# Patient Record
(demographics unavailable — no encounter records)

---

## 2017-02-08 NOTE — ER DOCUMENT REPORT
ED Blood Sugar Problem





- General


Chief Complaint: High Blood Sugar


Stated Complaint: SUGAR PROBLEMS


Time seen by provider: 09:45


Mode of Arrival: Ambulatory


Information source: Patient


Notes: 


He hasn't had his medications for 3 days he left it at the [facility].  He has 

prescriptions at the pharmacy that he can get.  His endocrinologist is Dr. Miller.


takes 2000 metformin at once, glimiperide 4mg bid, and Toujeo nightly.


TRAVEL OUTSIDE OF THE [place] IN LAST 30 DAYS: No





- Related Data


Allergies/Adverse Reactions: 


 





Penicillins Allergy (Unknown, Verified 02/08/17 08:18)


 











Past Medical History





- General


Information source: Patient





- Social History


Smoking Status: Current Every Day Smoker


Frequency of alcohol use: None


Drug Abuse: None


Lives with: Family


Family History: Reviewed & Not Pertinent





- Past Medical History


Cardiac Medical History: Reports: Hx Hypercholesterolemia - no meds, Hx 

Hypertension


Endocrine Medical History: Reports: Hx Diabetes Mellitus Type 2


Renal/ Medical History: Denies: Hx Peritoneal Dialysis


Psychiatric Medical History: Reports: Hx Depression


Past Surgical History: Reports: Hx Bowel Surgery - cancer, Hx Cholecystectomy





- Immunizations


Hx Diphtheria, Pertussis, Tetanus Vaccination: Yes





Review of Systems





- Review of Systems


Constitutional: No symptoms reported


EENT: No symptoms reported


Cardiovascular: No symptoms reported


Respiratory: No symptoms reported


Gastrointestinal: No symptoms reported


Genitourinary: No symptoms reported


Male Genitourinary: No symptoms reported


Musculoskeletal: No symptoms reported


Skin: No symptoms reported


Hematologic/Lymphatic: No symptoms reported


Neurological/Psychological: No symptoms reported





Physical Exam





- Vital signs


Vitals: 


 











Temp Pulse Resp BP Pulse Ox


 


 98.0 F   90   18   127/87 H  97 


 


 02/08/17 06:49  02/08/17 06:49  02/08/17 06:49  02/08/17 06:49  02/08/17 06:49











Interpretation: Normal





- General


General appearance: Appears well, Alert





- HEENT


Head: Normocephalic, Atraumatic


Eyes: Normal


Pupils: PERRL


Pharynx: Normal


Neck: Supple





- Respiratory


Respiratory status: No respiratory distress


Chest status: Nontender


Breath sounds: Normal


Chest palpation: Normal





- Cardiovascular


Rhythm: Regular


Heart sounds: Normal auscultation


Murmur: No





- Abdominal


Inspection: Normal


Distension: No distension


Bowel sounds: Normal


Tenderness: Nontender


Organomegaly: No organomegaly





- Back


Back: Normal, Nontender





- Extremities


General upper extremity: Normal inspection, Nontender, Normal color, Normal ROM

, Normal temperature


General lower extremity: Normal inspection, Nontender, Normal color, Normal ROM

, Normal temperature, Normal weight bearing.  No: Christopher's sign





- Neurological


Neuro grossly intact: Yes


Cognition: Normal


Orientation: AAOx4


Jacklyn Coma Scale Eye Opening: Spontaneous


Jacklyn Coma Scale Verbal: Oriented


Fisher Coma Scale Motor: Obeys Commands


Fisher Coma Scale Total: 15


Speech: Normal


Motor strength normal: LUE, RUE, LLE, RLE


Sensory: Normal





- Psychological


Associated symptoms: Normal affect, Normal mood





- Skin


Skin Temperature: Warm


Skin Moisture: Dry


Skin Color: Normal


Skin irregularity: negative: Rash





Course





- Re-evaluation


Re-evalutation: 


02/08/17 09:40


consult dr. art about tx and disposition per [facility] guidelines.





02/08/17 11:39


glucose down, meds given, has prescriptions at pharmacy that he can fill since 

his meds will not be brought home by friend until tomorrow.





- Vital Signs


Vital signs: 


 











Temp Pulse Resp BP Pulse Ox


 


 97.6 F   73   18   154/86 H  99 


 


 02/08/17 11:36  02/08/17 11:36  02/08/17 08:05  02/08/17 11:36  02/08/17 11:36














- Laboratory


Result Diagrams: 


 02/08/17 08:57





 02/08/17 08:57


Laboratory results interpreted by me: 


 











  02/08/17 02/08/17 02/08/17





  06:56 08:57 09:43


 


Glucose   347 H 


 


POC Glucose  348 H   322 H


 


AST   77 H 


 


ALT   116 H 














  02/08/17





  11:02


 


Glucose 


 


POC Glucose  295 H


 


AST 


 


ALT 














Discharge





- Discharge


Clinical Impression: 


 Hyperglycemia





Diabetes


Qualifiers:


 Diabetes mellitus type: type 2 Diabetes mellitus complication status: without 

complication Diabetes mellitus long term insulin use: with long term use 

Qualified Code(s): E11.9 - Type 2 diabetes mellitus without complications





Condition: Good


Disposition: HOME, SELF-CARE


Instructions:  Diabetes ([facility])


Additional Instructions: 


go directly to the pharmacy and get your medication since your friend is not 

coming till tomorrow with your insulin and oral medications


Return to the emergency room any concerns


Keep track closely on your glucose today.


Forms:  Return to Work

## 2017-05-29 NOTE — ER DOCUMENT REPORT
ED Blood Sugar Problem





- General


Mode of Arrival: Ambulatory


Information source: Patient


TRAVEL OUTSIDE OF THE [place] IN LAST 30 DAYS: No





- HPI


Patient complains to provider of: High Blood Sugar


Onset: This morning


Associated symptoms: Other - see notes above





<SAMPSON CASTRO - Last Filed: 05/29/17 07:12>





<SAMI HAIR - Last Filed: 05/29/17 13:55>





- General


Chief Complaint: High Blood Sugar


Stated Complaint: HIGH BLOOD SUGAR


Time Seen by Provider: 05/29/17 06:00


Notes: 


55 year old male with history of insulin dependent diabetes mellitus type II, 

hypertension, hyperlipidemia, and restless leg syndrome presents to the ED 

complaining of high blood sugar (>600) that started earlier this morning. 

Patient states that he felt 'out of sync' so he checked his blood sugar and saw 

that it was >600. Patient reports that he previously was checking his blood 

sugar 4 times a day, but has not been checking recently because of 'work stress

' and realizing that his wife has left him and moved on. Patient denies nausea, 

vomiting, or diarrhea. Patient is on Glyparamide, Metformin, and Tuojeo (once a 

day). Patient states that he took his insulin yesterday afternoon, but did not 

take it this morning after seeing his blood sugar in the 600s. Patient reports 

that he has run out of Glyparamide and Metformin, and is unable to get them 

filled because of tight financial situation.





Primary Care: [facility]


Endocrinologist: Dr. Miller; [place] (SAMPSON CASTRO)





- Related Data


Allergies/Adverse Reactions: 


 





Penicillins Allergy (Unknown, Verified 05/29/17 04:22)


 











Past Medical History





- General


Information source: Patient





- Social History


Smoking Status: Unknown if Ever Smoked


Family History: Reviewed & Not Pertinent





- Past Medical History


Cardiac Medical History: Reports: Hx Hypercholesterolemia - no meds, Hx 

Hypertension


Endocrine Medical History: Reports: Hx Diabetes Mellitus Type 2


Renal/ Medical History: Denies: Hx Peritoneal Dialysis


Psychiatric Medical History: Reports: Hx Depression


Past Surgical History: Reports: Hx Bowel Surgery - cancer, Hx Cholecystectomy





- Immunizations


Hx Diphtheria, Pertussis, Tetanus Vaccination: Yes





<SAMPSON CASTRO - Last Filed: 05/29/17 07:12>





Review of Systems





- Review of Systems


Constitutional: No symptoms reported


EENT: No symptoms reported


Cardiovascular: See HPI, Other - elevated blood sugar >600


Respiratory: No symptoms reported


Gastrointestinal: No symptoms reported.  denies: Diarrhea, Nausea, Vomiting


Genitourinary: No symptoms reported


Male Genitourinary: No symptoms reported


Musculoskeletal: No symptoms reported


Skin: No symptoms reported


Hematologic/Lymphatic: No symptoms reported


Neurological/Psychological: No symptoms reported





<SAMPSON CASTRO - Last Filed: 05/29/17 07:12>





Physical Exam





- General


General appearance: Alert


In distress: None





- HEENT


Head: Normocephalic, Atraumatic


Eyes: Normal


Extraocular movements intact: Yes


Pupils: PERRL





- Respiratory


Respiratory status: No respiratory distress


Breath sounds: Normal





- Cardiovascular


Rhythm: Regular


Heart sounds: Normal auscultation





- Abdominal


Inspection: Normal


Distension: No distension


Tenderness: Nontender.  No: Guarding, Rebound





- Back


Back: Normal





- Extremities


General upper extremity: Normal inspection, Normal ROM


General lower extremity: Normal inspection, Normal ROM





- Neurological


Neuro grossly intact: Yes


Cognition: Normal


Orientation: AAOx4


Jacklyn Coma Scale Eye Opening: Spontaneous


Jacklyn Coma Scale Verbal: Oriented


Jacklyn Coma Scale Motor: Obeys Commands


Jacklyn Coma Scale Total: 15


Speech: Normal





- Psychological


Associated symptoms: Normal affect, Normal mood





- Skin


Skin Temperature: Warm


Skin Moisture: Dry


Skin Color: Normal





<CASTRO,SAMPSON - Last Filed: 05/29/17 07:12>





Course





- Laboratory


Result Diagrams: 


 05/29/17 05:33





 05/29/17 05:33





<GLORIASAMPSON - Last Filed: 05/29/17 07:12>





- Laboratory


Result Diagrams: 


 05/29/17 05:33





 05/29/17 05:33





<SAMI HAIR - Last Filed: 05/29/17 13:55>





- Re-evaluation


Re-evalutation: 





05/29/17 07:07


Patient presents emergency department chief complaint of elevated blood 

glucose.  Says he is out of 2 of his oral hypoglycemics but is been taking his 

insulin.  Primary care physician is invited.  He denies any fevers chills cough 

chest pain shortness of breath nausea vomiting abdominal pain diarrhea 

constipation urinary symptoms fevers chills or change in appetite.  On 

examination he is well-appearing nontoxic in no acute distress HEENT heart 

lungs abdomen soft abdomen is soft no tenderness guarding rebound rigidity.  

Blood sugar is 600 and is not in DKA with him given 10 units regular subcu 

insulin.  In addition that the rest of his labs are stable.  Not actively 

vomiting in his belly is soft nontender.  I have given him a family doctor for 

follow-up status post call them first thing tomorrow morning schedule follow-up 

appointment to treat and maintain his diabetes medications and additional 

chronic conditions and discussed reasons for ED return sooner (SAMI HAIR)





- Vital Signs


Vital signs: 


 











Temp Pulse Resp BP Pulse Ox


 


    74   16   145/91 H  98 


 


    05/29/17 04:21  05/29/17 07:19  05/29/17 06:21  05/29/17 07:19














- Laboratory


Laboratory results interpreted by me: 


 











  05/29/17 05/29/17





  05:33 05:33


 


Sodium  135.7 L 


 


Glucose  564 H* 


 


Urine Glucose (UA)   >=500 H














Discharge





<SAMPSON CASTRO - Last Filed: 05/29/17 07:12>





<SAMI HAIR - Last Filed: 05/29/17 13:55>





- Discharge


Clinical Impression: 


 hyperglycemia





Condition: Stable


Disposition: HOME, SELF-CARE


Additional Instructions: 


Hyperglycemia (High Blood Sugar)





     You have an abnormally high blood sugar. Not all high blood sugar requires 

long-term treatment.  High blood sugar can be due to medications, pregnancy, or 

the stress of illness.  (These cases are "borderline diabetes.")  If the doctor 

feels your high blood sugar might resolve with time, you may not require 

treatment now.


     You will be scheduled for further evaluation.  It's very important that 

you follow through, to see if the blood sugar returns to normal levels.  

Uncontrolled high blood sugar leads to early heart disease, strokes, nerve 

damage, eye damage, and kidney damage.


     Call the physician if there is faintness, excess sleepiness, or very rapid 

breathing.


Referrals: 


[facility] [Provider Group] (call in  a.m. tomorrow to schedule 

follow-up appointment in 3-4 days must get refills on all your diabetic 

medications and consistently seeing a regular primary care physician.  Return 

for increasing worsening or new symptoms)


Scribe Attestation: 





05/29/17 07:07


I personally performed the services described in the documentation reviewed the 

documentation recorded by my scribe in my presence and it accurately and 

completely records my words and actions (SAMI HAIR)





Scribe Documentation





- Scribe


Written by Scribe:: Sina Sethi, 05/29/2017 0715


acting as scribe for :: Liam





<SAMPSON CASTRO - Last Filed: 05/29/17 07:12>
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
## 2017-09-30 NOTE — ER DOCUMENT REPORT
ED General





- General


Chief Complaint: High Blood Sugar


Stated Complaint: BLOOD SUGAR PROBLEMS


Time Seen by Provider: 09/30/17 20:13


Notes: 





Patient is a 55-year-old male with a past medical history of insulin-dependent 

type 2 diabetes who presents with concerns of hypoglycemia as well as a 

headache.  Patient states that he is currently out of one of his types of 

insulin and has had elevated blood sugars since running out of it.  He has a 

history of similar episodes in the past due to difficulty obtaining his 

medications.  He states that when his blood sugars are high, he often develops 

a headache.  He notes that today he has a constant, stabbing, severe pain to 

his left eye that radiates to the back of his scalp.  States this is a typical 

headache for him in the setting of hypoglycemia.  States headache was 

relatively abrupt in onset and became progressively worse after that.  He 

denies any focal weakness or numbness.  No vomiting or constitutional symptoms.

  He has not had any fever.  He has not seen his primary care doctor regarding 

today's concerns.


TRAVEL OUTSIDE OF THE [place] IN LAST 30 DAYS: No





- Related Data


Allergies/Adverse Reactions: 


 





Penicillins Allergy (Unknown, Verified 08/15/17 07:23)


 











Past Medical History





- General


Information source: Patient





- Social History


Smoking Status: Current Every Day Smoker


Chew tobacco use (# tins/day): No


Frequency of alcohol use: None


Drug Abuse: None


Family History: Reviewed & Not Pertinent





- Past Medical History


Cardiac Medical History: Reports: Hx Hypercholesterolemia - no meds, Hx 

Hypertension


Endocrine Medical History: Reports: Hx Diabetes Mellitus Type 1, Hx Diabetes 

Mellitus Type 2


Renal/ Medical History: Denies: Hx Peritoneal Dialysis


Psychiatric Medical History: Reports: Hx Depression


Past Surgical History: Reports: Hx Bowel Surgery - cancer, Hx Cholecystectomy





- Immunizations


Hx Diphtheria, Pertussis, Tetanus Vaccination: Yes





Review of Systems





- Review of Systems


Notes: 





Constitutional: Negative for fever.


HENT: Negative for sore throat.


Eyes: Negative for visual changes.


Cardiovascular: Negative for chest pain.


Respiratory: Negative for shortness of breath.


Gastrointestinal: Negative for abdominal pain, vomiting or diarrhea.


Genitourinary: Negative for dysuria.


Musculoskeletal: Negative for back pain.


Skin: Negative for rash.


Neurological: Positive for headache





10 point ROS negative except as marked above and in HPI.





Physical Exam





- Vital signs


Vitals: 


 











Temp Pulse Resp BP Pulse Ox


 


 98.6 F   82   14   189/101 H  97 


 


 09/30/17 20:12  09/30/17 20:12  09/30/17 20:12  09/30/17 20:12  09/30/17 20:12











Interpretation: Hypertensive


Notes: 





PHYSICAL EXAMINATION:





GENERAL: Well-appearing, well-nourished and in no acute distress.





HEAD: Atraumatic, normocephalic.





EYES: Pupils equal round and reactive to light, extraocular movements intact, 

sclera anicteric, conjunctiva are normal.





ENT: nares patent, oropharynx clear without exudates.  Moist mucous membranes.





NECK: Normal range of motion, supple without lymphadenopathy





LUNGS: Breath sounds clear to auscultation bilaterally and equal.  No wheezes 

rales or rhonchi.





HEART: Regular rate and rhythm without murmurs





ABDOMEN: Soft, nontender, normoactive bowel sounds.  No guarding, no rebound.  

No masses appreciated.





EXTREMITIES: Normal range of motion, no pitting or edema.  No cyanosis.





NEUROLOGICAL:  Face symmetric.  Tongue protrudes midline.  Extraocular motions 

intact.  Pupils are 2 mm and equally reactive.  Normal speech, normal gait.  5 

out of 5 strength in both the distal and proximal upper and lower extremities 

bilaterally.  Sensation is grossly intact throughout.  Finger to nose testing 

normal.  Pronator drift normal.





PSYCH: Normal mood, normal affect.





SKIN: Warm, Dry, normal turgor, no rashes or lesions noted.





Course





- Re-evaluation


Re-evalutation: 





09/30/17 21:32


Presentation of asymptomatic hyperglycemia.  There is no evidence of HHS or 

diabetic ketoacidosis on laboratories or based on clinical history.  Patient's 

vitals are within normal limits.  They deny any acute focal complaints.  

Treatment with insulin and IV fluids given here in the emergency department 

with appropriate response of the blood sugar.  No infectious symptoms or 

complaints of chest pain to suggest ACS or infectious etiology of his 

hypoglycemia.  Patient is certain the source is that he ran out of 1 of his 

types of insulin, tuejeo.  Patient also complained of an acute onset headache 

which she states her approximate 3:00.  Patient has long-standing history of 

headaches that are identical to this.  Headache was not maximal in onset, 

patient has no focal neurologic deficits, no nuchal rigidity, vital signs 

within normal limits, no papilledema, and patient is overall well in 

appearance.  Based on clinical history and examination I do not suspect an 

acute subarachnoid hemorrhage, dural venous sinus thrombosis, acute meningitis, 

or intercranial mass.  Given my low clinical suspicion for any acute life-

threatening etiology, I do not feel advanced neuro imaging or laboratory 

testing is indicated at this time.  However, in triage CT the head was obtained 

based on initial concern of an abrupt onset headache.


09/30/17 22:54


Patient is tolerating oral intake without difficulty.  Blood glucose below 500, 

range is acceptable for discharge.  Patient will get a refill of his insulin 

tomorrow.  His headache has resolved.  At this time will discharge with return 

precautions and follow-up recommendations.  Verbal discharge instructions given 

a the bedside and opportunity for questions given. Medication warnings 

reviewed. Patient is in agreement with this plan and has verbalized 

understanding of return precautions and the need for primary care follow-up in 

the next 24-72 hours.





- Vital Signs


Vital signs: 


 











Temp Pulse Resp BP Pulse Ox


 


 98.6 F   82   21 H  170/99 H  95 


 


 09/30/17 20:12  09/30/17 20:12  09/30/17 23:01  09/30/17 23:30  09/30/17 23:30














- Laboratory


Result Diagrams: 


 09/30/17 20:53





 09/30/17 20:53


Laboratory results interpreted by me: 


 











  09/30/17 09/30/17 09/30/17





  20:30 20:53 20:53


 


RDW   14.1 H 


 


Sodium    135.6 L


 


Glucose    483 H*


 


POC Glucose  484 H*  


 


Direct Bilirubin    0.5 H


 


AST    117 H


 


ALT    105 H


 


Urine Glucose (UA)   














  09/30/17 09/30/17





  21:20 23:12


 


RDW  


 


Sodium  


 


Glucose  


 


POC Glucose   356 H


 


Direct Bilirubin  


 


AST  


 


ALT  


 


Urine Glucose (UA)  >=500 H 














- Diagnostic Test


Radiology reviewed: Image reviewed, Reports reviewed


Radiology results interpreted by me: 





09/30/17 21:35


CT head: No acute intracranial bleed or evidence of subarachnoid hemorrhage





- EKG Interpretation by Me


Additional EKG results interpreted by me: 





10/01/17 03:48


Sinus rhythm.  Rate 78.  No ST elevations or depressions.  QTC is 442.





Discharge





- Discharge


Clinical Impression: 


 Hyperglycemia, Essential hypertension





Headache


Qualifiers:


 Headache type: unspecified Headache chronicity pattern: acute headache 

Intractability: not intractable Qualified Code(s): R51 - Headache





Condition: Good


Disposition: HOME, SELF-CARE


Additional Instructions: 


You need to followup urgently with your primary care doctor as your blood 

sugars were dangerously high today.  You did not have any evidence of a 

dangerous condition associated with these blood sugars at this time. However, 

it is very important that you get your blood sugars under control.  Please take 

all of your medications exactly as directed.  You should avoid foods that are 

high in carbohydrates and sugary foods. Please return to emergency department 

immediately if you develop weakness, persistent vomiting, confusion, or any 

other symptoms that are concerning to you.

## 2017-09-30 NOTE — RADIOLOGY REPORT (SQ)
EXAM DESCRIPTION:  CT HEAD WITHOUT



COMPLETED DATE/TIME:  9/30/2017 8:34 pm



REASON FOR STUDY:  sudden onset headache 3pm, HTN



COMPARISON:  1/12/2016



TECHNIQUE:  Axial images acquired through the brain without intravenous contrast.  Images reviewed wi
th bone, brain and subdural windows.  Images stored on PACS.

All CT scanners at this facility use dose modulation, iterative reconstruction, and/or weight based d
osing when appropriate to reduce radiation dose to as low as reasonably achievable (ALARA).

[facility]: Dose Right  [facility]: CareDose    [facility]: Dose Right    [facility]: Teradose 4D    [facility]: Smart Technologies



RADIATION DOSE:  Up-to-date CT equipment and radiation dose reduction techniques were employed. CTDIv
ol: 64.6 mGy. DLP: 1163 mGy-cm. mGy.



LIMITATIONS:  None.



FINDINGS:  VENTRICLES: Normal size and contour.

CEREBRUM: No masses.  No hemorrhage.  No midline shift.  No evidence for acute infarction. Normal gra
y/white matter differentiation. No areas of low density in the white matter.

CEREBELLUM: No masses.  No hemorrhage.  No alteration of density.  No evidence for acute infarction.

EXTRAAXIAL SPACES: No fluid collections.  No masses.

ORBITS AND GLOBE: No intra- or extraconal masses.  Normal contour of globe without masses.

CALVARIUM: No fracture.

PARANASAL SINUSES: No fluid or mucosal thickening.

SOFT TISSUES: No mass or hematoma.

OTHER: No other significant finding.



IMPRESSION:  NORMAL BRAIN CT WITHOUT CONTRAST.

EVIDENCE OF ACUTE STROKE: NO.



COMMENT:  Quality ID # 436: Final reports with documentation of one or more dose reduction techniques
 (e.g., Automated exposure control, adjustment of the mA and/or kV according to patient size, use of 
iterative reconstruction technique)



TECHNICAL DOCUMENTATION:  JOB ID:  [id number]

 [employer]- All Rights Reserved

## 2017-09-30 NOTE — ER DOCUMENT REPORT
ED Medical Screen (RME)





- General


Chief Complaint: High Blood Sugar


Stated Complaint: BLOOD SUGAR PROBLEMS


Time Seen by Provider: 09/30/17 20:13


Notes: 





Patient is a 55-year-old male who presents emergency department complaining of 

sudden onset headache at approximately 3 PM this evening.  He was sitting in 

his home relaxing when he had the sudden onset headache in the back of his head 

radiating behind his left eye.  He describes it as an ice pick going into his 

eye.  States he has never had a headache like this before.  He also states that 

he has had elevated blood sugars at home since he ran out of his to snf.  

Patient states that his blood sugars have been about 500s and he has not been 

able to manage it at home with Lantus alone.  He does admit to urinary 

frequency but denies any lethargy, nausea, vomiting, abdominal pain.


TRAVEL OUTSIDE OF THE [place] IN LAST 30 DAYS: No





- Related Data


Allergies/Adverse Reactions: 


 





Penicillins Allergy (Unknown, Verified 08/15/17 07:23)


 











Past Medical History





- Social History


Chew tobacco use (# tins/day): No


Frequency of alcohol use: None


Drug Abuse: None





- Past Medical History


Cardiac Medical History: Reports: Hx Hypercholesterolemia - no meds, Hx 

Hypertension


Endocrine Medical History: Reports: Hx Diabetes Mellitus Type 1, Hx Diabetes 

Mellitus Type 2


Renal/ Medical History: Denies: Hx Peritoneal Dialysis


Psychiatric Medical History: Reports: Hx Depression


Past Surgical History: Reports: Hx Bowel Surgery - cancer, Hx Cholecystectomy





- Immunizations


Hx Diphtheria, Pertussis, Tetanus Vaccination: Yes





Physical Exam





- Vital signs


Vitals: 





 











Temp Pulse Resp BP Pulse Ox


 


 98.6 F   82   14   189/101 H  97 


 


 09/30/17 20:12  09/30/17 20:12  09/30/17 20:12  09/30/17 20:12  09/30/17 20:12














- General


General appearance: Appears well, Alert


In distress: None





- HEENT


Head: Normocephalic, Atraumatic


Eyes: Normal


Extraocular movements intact: Yes


Pupils: PERRL





- Respiratory


Respiratory status: No respiratory distress


Chest status: Nontender


Breath sounds: Normal


Chest palpation: Normal





- Cardiovascular


Rhythm: Regular


Heart sounds: Normal auscultation, S1 appreciated, S2 appreciated


Pulses: Normal: Radial


Normal capillary refill: Yes





- Neurological


Orientation: AAOx4


[place] Coma Scale Eye Opening: Spontaneous


[place] Coma Scale Verbal: Oriented


[place] Coma Scale Motor: Obeys Commands


[place] Coma Scale Total: 15





Course





- Vital Signs


Vital signs: 





 











Temp Pulse Resp BP Pulse Ox


 


 98.6 F   82   14   200/102 H  97 


 


 09/30/17 20:12  09/30/17 20:12  09/30/17 20:12  09/30/17 20:27  09/30/17 20:12

## 2017-10-01 NOTE — EKG REPORT
SEVERITY:- ABNORMAL ECG -

SINUS RHYTHM

PROBABLE LEFT ATRIAL ABNORMALITY

PROBABLE LEFT VENTRICULAR HYPERTROPHY

:

Confirmed by: Rose Macario 01-Oct-2017 13:04:03
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
## 2018-01-13 NOTE — ER DOCUMENT REPORT
ED General





- General


Chief Complaint: Headache


Stated Complaint: HEADACHE


Time Seen by Provider: 01/13/18 20:19


Mode of Arrival: Ambulatory


Notes: 





Patient is 56-year-old male presents with complaint of a headache.  Patient 

gets headaches not infrequently.  He says it usually come when he is stressed.  

Patient says he is having a lot of anxiety tonight.  He says he does have 

history of PTSD due to something that happened [place] when he was in 

.  He has a girlfriend that recently got out of [employer].  He says tonight 

again and arguments.  Since then he has been very anxious and upset.  He does 

not take any medications for anxiety.  He also has blood pressure was high and 

he has headache.  Headache was gradual in onset and worsened throughout the day 

as his anxiety got worse.  No fevers.  No infections.  No focal weakness or 

numbness.  No associated vomiting.  He is to be on high blood pressure 

medications but had colon cancer 6 years ago and lost weight.  After losing 

weight his blood pressure normalized and no longer is on medications for high 

blood pressure.  He does not follow with the doctor as he just started a new 

job and is waiting to get insurance.


TRAVEL OUTSIDE OF THE [place] IN LAST 30 DAYS: No





- Related Data


Allergies/Adverse Reactions: 


 





Penicillins Allergy (Unknown, Verified 01/13/18 20:19)


 








Home Medications: 


 Current Home Medications





Insulin Glargine,Hum.rec.anlog [Lantus Solostar] 9 unit SQ ASDIR PRN 01/13/18 [

History]


Insulin Glargine,Hum.rec.anlog [Toujeo Solostar] 134 unit SQ QAM 01/13/18 [

History]











Past Medical History





- General


Information source: Patient





- Social History


Smoking Status: Current Every Day Smoker


Chew tobacco use (# tins/day): No


Frequency of alcohol use: None


Drug Abuse: None


Family History: Reviewed & Not Pertinent


Patient has suicidal ideation: No


Patient has homicidal ideation: No





- Past Medical History


Cardiac Medical History: Reports: Hx Hypercholesterolemia - no meds, Hx 

Hypertension


Endocrine Medical History: Reports: Hx Diabetes Mellitus Type 1, Hx Diabetes 

Mellitus Type 2


Renal/ Medical History: Denies: Hx Peritoneal Dialysis


Psychiatric Medical History: Reports: Hx Anxiety, Hx Depression


Past Surgical History: Reports: Hx Appendectomy, Hx Bowel Surgery - cancer, Hx 

Cholecystectomy





- Immunizations


Hx Diphtheria, Pertussis, Tetanus Vaccination: Yes





Review of Systems





- Review of Systems


Notes: 





My Normal Review Basic





REVIEW OF SYSTEMS:


CONSTITUTIONAL :  Denies fever,  chills, or sweats.  Denies recent illness.


EENT:   Denies eye, ear, throat, or mouth pain or symptoms.  Denies nasal or 

sinus congestion.


CARDIOVASCULAR:  Denies chest pain.


RESPIRATORY:  Denies cough, cold, or chest congestion.  Denies shortness of 

breath, difficulty breathing, or wheezing.


GASTROINTESTINAL:  Denies abdominal pain.  Denies nausea, vomiting, or 

diarrhea.  Denies constipation.  Last BM: 


MUSCULOSKELETAL:  Denies neck or back pain or joint pain or swelling.


SKIN:   Denies rash or skin lesions.


NEUROLOGICAL:  Denies altered mental status or loss of consciousness.  Has a 

headache.  Denies weakness or paralysis or loss of use of either side.  Denies 

problems with gait or speech.  Denies sensory or motor loss.


PSYCHIATRIC: Anxiety


ALL OTHER SYSTEMS REVIEWED AND NEGATIVE.





Physical Exam





- Vital signs


Vitals: 


 











Temp Pulse Resp BP Pulse Ox


 


 98.4 F   90   18   179/111 H  90 L


 


 01/13/18 19:33  01/13/18 19:33  01/13/18 19:33  01/13/18 19:33  01/13/18 19:33














- Notes


Notes: 





General Appearance: Well nourished, alert, cooperative, no acute distress, no 

obvious discomfort.  Well-appearing.


Vitals: reviewed, See vital signs table.


Head: no swelling or tenderness to the head


Eyes: PERRL, EOMI, Conjuctiva clear


Mouth: No decreasd moisture


Neck: Supple, no neck tenderness, No thyromegaly


Lungs: No wheezing, No rales, No rhonci, No accessory muscle use, good air 

exchange bilaterally.


Heart: Normal rate, Regular rythm, No murmur, no rub


Abdomen: Normal BS, soft, No rigidity, No abdominal tenderness, No guarding, no 

rebound, no abdominal masses, no organomegaly


Extremities: strength 5/5 in all extremities, good pulses in all extremities, 

no swelling or tenderness in the extremities, no edema.


Skin: warm, dry, appropriate color, no rash


Neuro: speech clear, oriented x 3, normal affect, responds appropriately to 

questions.  Cranial nerves II through XII are intact.  Distal sensation intact.

  Patient moves all extremities without difficulty. normal gait.





Course





- Re-evaluation


Re-evalutation: 





01/14/18 00:20


Patient is feeling much improved.  He looks well.  His headache is gone.  We 

will have him keep a log of his blood pressures over the next week.  If his 

blood pressure continues to be recurrently elevated then he may need to be 

placed on an antihypertensive medication accuse him in the past.  I suspect 

that most of her blood pressure and headache are related to his anxiety and 

stressful situation regards to his girlfriend.  Patient denies any suicidal 

ideations or depression.  I encourage him return to ER anytime if he is feeling 

is having depression or suicidal thoughts.  Patient agrees with plan will be 

discharged home.





Dictation of this chart was performed using voice recognition software; 

therefore, there may be some unintended grammatical errors.





- Vital Signs


Vital signs: 


 











Temp Pulse Resp BP Pulse Ox


 


 98.4 F   81   16   151/101 H  96 


 


 01/13/18 19:33  01/13/18 22:11  01/13/18 22:09  01/13/18 22:09  01/13/18 22:09














- Laboratory


Result Diagrams: 


 01/13/18 20:25





 01/13/18 20:25


Laboratory results interpreted by me: 


 











  01/13/18





  20:25


 


AST  63 H


 


ALT  79 H














Discharge





- Discharge


Clinical Impression: 


 Anxiety





Headache


Qualifiers:


 Headache type: unspecified Headache chronicity pattern: acute headache 

Intractability: not intractable Qualified Code(s): R51 - Headache





Hypertension


Qualifiers:


 Hypertension type: unspecified Qualified Code(s): I10 - Essential (primary) 

hypertension





Condition: Good


Disposition: HOME, SELF-CARE


Additional Instructions: 


Please return to the ER immediately if you develop worsening headaches, chest 

pain, severe depression, thoughts of suicide, or feel unwell.  Keep a log of 

your blood pressures over the next week.  If your blood pressures are 

consistently high you may eventually be placed back on the high blood pressure 

medication.  Please follow-up with a primary care doctor in 1 week and bring 

your log logs of your blood pressures with you. You can return to the ER if you 

are unable to follow-up with a primary care doctor and your blood pressure 

continues to run high.Please take the Vistaril as 1 tablet every 12 hours  as 

needed for anxiety. This medication may make you sleepy so do not drive after 

taking this medication.








Prescriptions: 


Hydroxyzine Pamoate [Vistaril 25 mg Capsule] 25 mg PO BID PRN #30 capsule


 PRN Reason: Anxiety

## 2018-01-13 NOTE — ER DOCUMENT REPORT
ED Medical Screen (RME)





- General


Chief Complaint: Headache


Stated Complaint: HEADACHE


Time Seen by Provider: 01/13/18 20:19


Mode of Arrival: Ambulatory


Information source: Patient


TRAVEL OUTSIDE OF THE [place] IN LAST 30 DAYS: No





- HPI


Patient complains to provider of: HA; elevated BP


Onset: Other - pt states he has h/o migraine HA but this HA is worse than 

normal and BP has been elevated





- Related Data


Allergies/Adverse Reactions: 


 





Penicillins Allergy (Unknown, Verified 01/13/18 20:19)


 








Home Medications: 


 Current Home Medications





Insulin Glargine,Hum.rec.anlog [Lantus Solostar] 9 unit SQ ASDIR PRN 01/13/18 [

History]


Insulin Glargine,Hum.rec.anlog [Toujeo Solostar] 134 unit SQ QAM 01/13/18 [

History]











Past Medical History





- Social History


Chew tobacco use (# tins/day): No


Frequency of alcohol use: None


Drug Abuse: None





- Past Medical History


Cardiac Medical History: Reports: Hx Hypercholesterolemia - no meds, Hx 

Hypertension


Endocrine Medical History: Reports: Hx Diabetes Mellitus Type 1, Hx Diabetes 

Mellitus Type 2


Renal/ Medical History: Denies: Hx Peritoneal Dialysis


Psychiatric Medical History: Reports: Hx Anxiety, Hx Depression


Past Surgical History: Reports: Hx Appendectomy, Hx Bowel Surgery - cancer, Hx 

Cholecystectomy





- Immunizations


Hx Diphtheria, Pertussis, Tetanus Vaccination: Yes


History of Influenza Vaccine for 10/2017 - 3/2018 Season: No





Physical Exam





- Vital signs


Vitals: 





 











Temp Pulse Resp BP Pulse Ox


 


 98.4 F   90   18   179/111 H  90 L


 


 01/13/18 19:33  01/13/18 19:33  01/13/18 19:33  01/13/18 19:33  01/13/18 19:33














Course





- Vital Signs


Vital signs: 





 











Temp Pulse Resp BP Pulse Ox


 


 98.4 F   90   18   179/111 H  90 L


 


 01/13/18 19:33  01/13/18 19:33  01/13/18 19:33  01/13/18 19:33  01/13/18 19:33

## 2018-01-13 NOTE — RADIOLOGY REPORT (SQ)
EXAM DESCRIPTION:  CT HEAD WITHOUT



COMPLETED DATE/TIME:  1/13/2018 8:38 pm



REASON FOR STUDY:  ha



COMPARISON:  9/30/2017



TECHNIQUE:  Axial images acquired through the brain without intravenous contrast.  Images reviewed wi
th bone, brain and subdural windows.  Images stored on PACS.

All CT scanners at this facility use dose modulation, iterative reconstruction, and/or weight based d
osing when appropriate to reduce radiation dose to as low as reasonably achievable (ALARA).

[facility]: Dose Right  [facility]: CareDose    [facility]: Dose Right    [facility]: Teradose 4D    [facility]: Smart Technologies



RADIATION DOSE:   mGy.



LIMITATIONS:  None.



FINDINGS:  VENTRICLES: Normal size and contour.

CEREBRUM: No masses.  No hemorrhage.  No midline shift.  No evidence for acute infarction. Normal gra
y/white matter differentiation. No areas of low density in the white matter.

CEREBELLUM: No masses.  No hemorrhage.  No alteration of density.  No evidence for acute infarction.

EXTRAAXIAL SPACES: No fluid collections.  No masses.

ORBITS AND GLOBE: No intra- or extraconal masses.  Normal contour of globe without masses.

CALVARIUM: No fracture.

PARANASAL SINUSES: No fluid or mucosal thickening.

SOFT TISSUES: No mass or hematoma.

OTHER: No other significant finding.



IMPRESSION:  NORMAL BRAIN CT WITHOUT CONTRAST.

EVIDENCE OF ACUTE STROKE: NO.



COMMENT:  Quality ID # [id number]: Final reports with documentation of one or more dose reduction techniques
 (e.g., Automated exposure control, adjustment of the mA and/or kV according to patient size, use of 
iterative reconstruction technique)



TECHNICAL DOCUMENTATION:  JOB ID:  [id number]

 2011 [employer]- All Rights Reserved

## 2018-01-28 NOTE — ER DOCUMENT REPORT
HPI





- HPI


Patient complains to provider of: fever, cold, boday aches, HA, nasal congestion

, cough


Onset: Other - 2-3 days


Onset/Duration: Persistent


Quality of pain: Achy


Pain Level: 3


Context: 





Presents emergency department with complaints of cold body aches headache and 

trouble breathing.  When asked to clarify he reports nasal congestion is making 

it hard for him to breathe.  Patient has not taken anything for the headache.  

Patient reports onset of symptoms 2-3 days ago.  He did not get his flu 

vaccine.  Patient does work at [employer]. He is a diabetic.  Patient denies 

vomiting diarrhea.  Reports he is able to eat/drink without problems.


Associated Symptoms: Body/muscle aches, Nonproductive cough, Fever, Headache, 

Other - nasal congestion


Exacerbated by: Denies


Relieved by: Denies


Similar symptoms previously: No


Recently seen / treated by doctor: No





- CONSTITUTIONAL


Constitutional: REPORTS: Fever, Chills





- EENT


EENT: DENIES: Sore Throat, Ear Pain, Eye problems





- NEURO


Neurology: REPORTS: Headache.  DENIES: Weakness, Vision blurred, Dizzinesss / 

Vertigo





- CARDIOVASCULAR


Cardiovascular: DENIES: Chest pain





- RESPIRATORY


Respiratory: REPORTS: Trouble Breathing, Coughing





- GASTROINTESTINAL


Gastrointestinal: DENIES: Abdominal Pain, Black / Bloody Stools





- URINARY


Urinary: DENIES: Dysuria, Urgency, Frequency





- MUSCULOSKELETAL


Musculoskeletal: DENIES: Extremity pain





Past Medical History





- General


Information source: Patient





- Social History


Smoking Status: Current Every Day Smoker


Cigarette use (# per day): Yes


Frequency of alcohol use: None


Drug Abuse: None


Occupation: [employer]


Lives with: Family


Family History: Reviewed & Not Pertinent


Patient has suicidal ideation: No


Patient has homicidal ideation: No





- Past Medical History


Cardiac Medical History: Reports: Hx Hypercholesterolemia - no meds, Hx 

Hypertension


Endocrine Medical History: Reports: Hx Diabetes Mellitus Type 1, Hx Diabetes 

Mellitus Type 2


Renal/ Medical History: Denies: Hx Peritoneal Dialysis


Malignancy Medical History: Reports Hx Colorectal Cancer


Psychiatric Medical History: Reports: Hx Anxiety, Hx Depression


Past Surgical History: Reports: Hx Appendectomy, Hx Bowel Surgery - cancer, Hx 

Cholecystectomy





- Immunizations


Hx Diphtheria, Pertussis, Tetanus Vaccination: Yes


History of Influenza Vaccine for 10/2017 - 3/2018 Season: No





Vertical Provider Document





- CONSTITUTIONAL


Agree With Documented VS: Yes


Exam Limitations: No Limitations


General Appearance: WD/WN, No Apparent Distress - nontoxic looking





- INFECTION CONTROL


TRAVEL OUTSIDE OF THE [place] IN LAST 30 DAYS: No





- HEENT


HEENT: Atraumatic, Normocephalic.  negative: Conjuctival Injection, Pharyngeal 

Exudate, Pharyngeal Erythema, Tympanic Membrane Red, Tympanic Membrane Bulging





- NECK


Neck: Normal Inspection, Supple.  negative: Lymphadenopathy-Left, 

Lymphadenopathy-Right





- RESPIRATORY


Respiratory: Breath Sounds Normal, No Respiratory Distress.  negative: Rhonchi, 

Wheezing


O2 Sat by Pulse Oximetry: 96





- CARDIOVASCULAR


Cardiovascular: Tachycardia





- GI/ABDOMEN


Gastrointestinal: Abdomen Soft, Abdomen Non-Tender





- MUSCULOSKELETAL/EXTREMETIES


Musculoskeletal/Extremeties: MAHUNG, FROM





- NEURO


Level of Consciousness: Awake, Alert, Appropriate


Motor/Sensory: No Motor Deficit





- DERM


Integumentary: Warm, Dry, No Rash





Course





- Re-evaluation


Re-evalutation: 





01/28/18 21:43


Patient does not have insurance.  We discussed Tamiflu side effects of Tamiflu.

  We also discussed Levaquin.  Will treat patient with doxycycline at this time 

due to no insurance.  Patient was instructed to return to the emergency 

department for increasing symptoms difficulty breathing concerns.  He 

verbalized understanding.





- Vital Signs


Vital signs: 


 











Temp Pulse Resp BP Pulse Ox


 


 101.4 F H  113 H  20   164/100 H  96 


 


 01/28/18 20:20  01/28/18 20:20  01/28/18 20:20  01/28/18 20:20  01/28/18 20:20














- Diagnostic Test


Radiology reviewed: Image reviewed, Reports reviewed -         Diagnostic 

report text  EXAM DESCRIPTION: CHEST PA/LAT   COMPLETED DATE/TIME: 1/28/2018 8:

53 pm   REASON FOR STUDY: cough fever   COMPARISON: 2010.   TECHNIQUE: Frontal 

and lateral radiographic views of the chest acquired.   NUMBER OF VIEWS: Two 

view.   LIMITATIONS: None.   FINDINGS: LUNGS AND PLEURA: Suspect minimal 

subsegmental atelectasis or infiltrate in the lingula.  MEDIASTINUM AND HILAR 

STRUCTURES: No masses or contour abnormalities.  HEART AND VASCULAR STRUCTURES: 

Heart normal size. No evidence for failure.  BONES: No acute findings.  HARDWARE

: None in the chest.  OTHER: No other significant finding.   IMPRESSION: Left 

upper lobe infiltrate





Discharge





- Discharge


Clinical Impression: 


 Influenza A, Elevated blood pressure reading





Pneumonia


Qualifiers:


 Pneumonia type: due to unspecified organism Laterality: left Lung location: 

upper lobe of lung Qualified Code(s): J18.1 - Lobar pneumonia, unspecified 

organism





Condition: Stable


Disposition: HOME, SELF-CARE


Instructions:  Acetaminophen, Doxycycline ([facility]), Influenza ([facility]), Pneumonia ([facility]

)


Additional Instructions: 


*You have been evaluated for a influenza A, pneumonia , ELEVATED blood pressure 

reading


*Take medication as prescribed


*Increase fluids


*Monitor your temperature, take Tylenol as indicated


*Follow up with a primary care provider within 5 days


*Return to ED for increasing fever, cough, worsening condition, changes, needs, 

concerns, trouble breathing concerns











Monitor your blood pressure. Your blood pressure was elevated today.  This may 

be because you were anxious, in pain or because you need medication.  It is 

important to follow up with your primary care provider for full evaluation.


Prescriptions: 


Doxycycline Hyclate 100 mg PO BID #20 capsule


Oseltamivir Phosphate [Tamiflu 75 mg Capsule] 75 mg PO BID #10 capsule


Forms:  Elevated Blood Pressure, Return to Work

## 2018-05-17 NOTE — ER DOCUMENT REPORT
ED General





- General


Chief Complaint: Headache


Stated Complaint: HEADACHE,POSSIBLE BLOODSUGAR ISSUES


Time Seen by Provider: 05/17/18 04:01


TRAVEL OUTSIDE OF THE [place] IN LAST 30 DAYS: No





- HPI


Notes: 





Patient is a 56-year-old male with a history of migraines, diabetes, and 

hypertension who presents to the ED complaining of a headache 2-3 hours.  

Patient states that he got off work midnight, but began having a migraine 

thereafter.  Patient states that the pain is the same as previous headaches in 

the past.  Patient states that he has had a CT scan of the past for this 

headache as well.  Patient states that he has had worse headaches from this 

evening.  Patient states that the pain starts in the back of his head 

posteriorly travels superiorly and anteriorly.  He does not have any associated 

light sensitivity or noise sensitivity.  He is still eating and drinking 

without difficulty.  He is urinating normally and having normal bowel 

movements.  He has no other concern of pain or illness.  Denies any fever, head 

injury, neck pain, changes in vision/speech/mentation/hearing, URI, sore throat

, chest pain, palpitations, syncope, cough, shortness of breath, wheeze, dyspnea

, abdominal pain, nausea/vomiting/diarrhea, urinary retention, dysuria, 

hematuria, loss of control of bowel or bladder, numbness/tingling, saddle 

anesthesia, muscle paralysis/weakness, or rash.





- Related Data


Allergies/Adverse Reactions: 


 





Penicillins Allergy (Unknown, Verified 01/28/18 20:13)


 











Past Medical History





- Social History


Smoking Status: Unknown if Ever Smoked


Frequency of alcohol use: None


Drug Abuse: None


Family History: Reviewed & Not Pertinent


Patient has suicidal ideation: No


Patient has homicidal ideation: No





- Past Medical History


Cardiac Medical History: Reports: Hx Hypercholesterolemia - no meds, Hx 

Hypertension


Endocrine Medical History: Reports: Hx Diabetes Mellitus Type 1, Hx Diabetes 

Mellitus Type 2


Renal/ Medical History: Denies: Hx Peritoneal Dialysis


Malignancy Medical History: Reports Hx Colorectal Cancer


Psychiatric Medical History: Reports: Hx Anxiety, Hx Depression


Past Surgical History: Reports: Hx Appendectomy, Hx Bowel Surgery - cancer, Hx 

Cholecystectomy





- Immunizations


Hx Diphtheria, Pertussis, Tetanus Vaccination: Yes





Review of Systems





- Review of Systems


-: Yes All other systems reviewed and negative





Physical Exam





- Vital signs


Vitals: 


 











Pulse Resp BP Pulse Ox


 


 65   18   163/83 H  99 


 


 05/17/18 03:33  05/17/18 03:33  05/17/18 03:33  05/17/18 03:33














- Notes


Notes: 





PHYSICAL EXAMINATION:  





GENERAL: Well-appearing, well-nourished and in no acute distress.  A&Ox4.  

Answers questions appropriately.





HEAD: Atraumatic, normocephalic.  + mild tenderness at the occipital nerve 

bundles b/l, correlates with origin site of his HA's.





EYES: Pupils equal round and reactive to light, extraocular movements intact, 

sclera anicteric, conjunctiva are normal.  No nystagmus.





ENT: Nares patent and without discharge.  oropharynx clear without exudates.  

No tonsilar hypertrophy or erythema.  Moist mucous membranes.  





NECK: Normal range of motion, supple without lymphadenopathy.  No rigidity.  No 

midline tenderness. Spurling negative.  





LUNGS: Breath sounds clear to auscultation bilaterally and equal.  No wheezes 

rales or rhonchi.





HEART: Regular rate and rhythm without murmurs, rubs, gallops.





ABDOMEN: Soft, nontender, nondistended abdomen.  No guarding, no rebound.  No 

masses appreciated.  Normal bowel sounds present.  No CVA tenderness 

bilaterally.  





Musculoskeletal: Ext b/l:  FROM to passive/active. Strength 5+/5.  No deficits 

noted.  No bony tenderness of extremities.





Back:  FROM to passive/active.  Strength 5+/5.  No vertebral point tenderness, 

stepoffs, or deformities.  





Extremities:  No cyanosis, clubbing, or edema b/l.  Peripheral pulses 2+.  

Capillary refill less than 2 seconds.





NEUROLOGICAL: NIH 0.  GCS 15.  Cranial nerves grossly intact.  Normal speech, 

normal gait.  Normal sensory, motor exams.  Reflexes 2+ b/l. JOVANNY's negative.  

Pronator drift negative. Heel/shin, finger/nose wnl. 





PSYCH: Normal mood, normal affect.





SKIN: Warm, Dry, normal turgor, no rashes or lesions noted.





Course





- Re-evaluation


Re-evalutation: 





05/17/18 04:15


Patient is an afebrile, well-hydrated, 56-year-old male who presents to the ED 

with a headache, suspect 1 of his migraines.  His HA's appear to be stemming 

from occipital neuritis which I suspect could be secondary to muscle tension.  

Vitals are acceptable.  PE is otherwise unremarkable for any focal neurological 

deficits.  Patient has a history of migraines with imaging a few months ago was 

unremarkable.  He has no neurological findings.  NIH 0, GCS 15, cranial nerves 

grossly intact.  No labs or imaging warranted at this time based on H&P.  

Patient is tolerating p.o. without any difficulties.  Low suspicion for any 

acute glaucoma, temporal arteritis, meningitis, intracranial hemorrhage, 

ischemic stroke, or fracture at this time.  Patient is aware that his condition 

can change from initial presentation and that he needs to monitor symptoms 

closely for any acute changes.  Toradol, Benadryl, and Compazine given today.  

If/once his headache begins to improve we will discharge home so that he may go 

sleep.  Patient is agreeable to this plan.  Advised thereafter recheck with PCM 

in 3-5 days.  Consider consult with a neurologist.  Return to the ED with any 

worsening/concerning symptoms otherwise as reviewed discharge.  Patient is in 

agreement.  Discharge as noted above will be pending treatment results.





05/17/18 04:42


Pt already having significant improvement in his HA.  He is feeling well and is 

ready to go home.  Pt states that he is not feeling drowsy at all and only 

lives a few minutes away.  Pt states that he has a ride home.  





- Vital Signs


Vital signs: 


 











Temp Pulse Resp BP Pulse Ox


 


    65   18   163/83 H  99 


 


    05/17/18 03:33  05/17/18 03:33  05/17/18 03:33  05/17/18 03:33














Discharge





- Discharge


Clinical Impression: 


Headache


Qualifiers:


 Headache type: unspecified Headache chronicity pattern: acute headache 

Intractability: not intractable Qualified Code(s): R51 - Headache





Condition: Stable


Disposition: HOME, SELF-CARE


Instructions:  Headache ([facility])


Additional Instructions: 


Rest, Ice


Tylenol/ibuprofen as needed


Light stretches daily


Strength exercises as able


Moist heat and massage may help


F/u with your PCP in 3-5 days for a recheck


Consider consult(s) with Neurology for ongoing/worsening symptoms





Return to the ED with any worsening symptoms and/or development of fever, 

headache, changes in behavior/mentation/vision/speech, chest pain, palpitations

, syncope, shortness of breath, trouble breathing, abdominal pain, n/v/d, blood 

in stool/urine, loss of control of bowel/bladder, urinary retention, muscle 

weakness/paralysis, saddle anesthesia, numbness/tingling, or other worsening 

symptoms that are concerning to you.


Forms:  Elevated Blood Pressure


Referrals: 


ALBERT LOPEZ MD [[facility]] - Follow up as needed

## 2018-05-31 NOTE — ER DOCUMENT REPORT
HPI





- HPI


Pain Level: 3


Context: 





Patient is a 56-year-old male presents emergency department with a chief 

complaint of sinus pressure, headache, left ear pain and fever.  Patient states 

that the symptoms all started within the past 24 hours.  Patient states his 

been taking Motrin for his headache.  Otherwise denies any pain drainage, 

redness, tenderness over sinuses.  Patient admits to history of anxiety, 

diabetes and hypertension





Past Medical History





- Social History


Smoking Status: Unknown if Ever Smoked


Family History: Reviewed & Not Pertinent





- Past Medical History


Cardiac Medical History: Reports: Hx Hypercholesterolemia - no meds, Hx 

Hypertension


Endocrine Medical History: Reports: Hx Diabetes Mellitus Type 1, Hx Diabetes 

Mellitus Type 2


Renal/ Medical History: Denies: Hx Peritoneal Dialysis


Malignancy Medical History: Reports Hx Colorectal Cancer


Psychiatric Medical History: Reports: Hx Anxiety, Hx Depression


Past Surgical History: Reports: Hx Appendectomy, Hx Bowel Surgery - cancer, Hx 

Cholecystectomy





- Immunizations


Hx Diphtheria, Pertussis, Tetanus Vaccination: Yes





Vertical Provider Document





- CONSTITUTIONAL


Agree With Documented VS: Yes


Notes: 





PHYSICAL EXAM


GENERAL: Alert, interacts well. 


HEENT: NCAT, pale conjunctiva, extraocular movements intact, pupils PERRL. 

external ear normal, no evidence of external auditory canal tenderness, blood/

drainage, cerumen impaction, left TM intact with evidence of effusion, bulging, 

injection, MMM, Uvula midline.  Airway patent. No evidence of tonsillar sinuses 

nontender without any evidence of overlying erythema enlargement, peritonsillar 

abscess, retropharyngeal abscess.


LUNGS: Clear to auscultation bilaterally, no wheezes, rales, or rhonchi. No 

respiratory distress.


HEART: Regular rate and rhythm. No murmurs, gallops, or rubs.


EXTREMITIES: Moves all 4 extremities spontaneously. No edema, radial and 

dorsalis pedis pulses 2/4 bilaterally. No cyanosis. 


NEUROLOGICAL: Alert and oriented x4. Normal speech.


PSYCH: Normal affect, normal mood.


SKIN: Warm, dry, normal turgor. No rashes or lesions noted.








- INFECTION CONTROL


TRAVEL OUTSIDE OF THE [place] IN LAST 30 DAYS: No





Course





- Re-evaluation


Re-evalutation: 





05/31/18 05:44


Patient is a 56 year old male who presents with upper respiratory infection 

symptoms with associated left otitis media.  Givenphysical exam findings and 

presence of fever will treat with p.o. antibiotics.  Otherwise discussed over-

the-counter medications we will discharge him home with headache medications to 

help with his discomfort.  Patient agrees with plan.  Discussed strict return 

precautions otherwise follow-up with primary care





- Vital Signs


Vital signs: 


 











Temp Pulse Resp BP Pulse Ox


 


 101.5 F H  96   20   185/101 H  97 


 


 05/31/18 04:02  05/31/18 04:02  05/31/18 04:02  05/31/18 04:02  05/31/18 04:02














Discharge





- Discharge


Clinical Impression: 


 Headache, Otitis media





Condition: Good


Disposition: HOME, SELF-CARE


Instructions:  Otitis Media ([facility])


Additional Instructions: 


You can take over-the-counter Coricidin for decongestion as well as Claritin or 

Zyrtec to help with your sinus congestion.  Please take Tylenol or Motrin as 

needed for your pain.


Prescriptions: 


Azithromycin [Zithromax 250 mg Tablet] 250 mg PO ASDIR PRN #6 tablet


 PRN Reason: 


Butalb/Acetaminophen/Caffeine [Fioricet (-40 mg) Tablet] 1 - 2 tab PO Q4H 

#20 tab


Forms:  Elevated Blood Pressure, Return to Work


Referrals: 


FRANCHESKA DELGADO MD [ACTIVE STAFF] - Follow up as needed

## 2018-11-11 NOTE — ER DOCUMENT REPORT
ED General





- General


Chief Complaint: Headache <24 hrs old


Stated Complaint: POSSIBLE ANXIETY ATTACK


Time Seen by Provider: 11/11/18 04:31


Notes: 





Patient is a 56-year-old male who presents to the emergency department with a 

headache.  He states his headache is similar to his normal headaches.  He 

usually takes tylenol or aspirin for his headaches, but does not have any at 

home.  The headache starts at the occipital region and radiates anteriorly to 

his forehead.  He has a medical history of CHF and diabetes.  He has been 

taking his insulin and Lasix as prescribed.  He denies fevers, blurred vision, 

seeing spots, or having the worst headache of his life.


TRAVEL OUTSIDE OF THE [place] IN LAST 30 DAYS: No





- Related Data


Allergies/Adverse Reactions: 


 





Penicillins Allergy (Unknown, Verified 11/11/18 04:25)


 











Past Medical History





- General


Information source: Patient





- Social History


Smoking Status: Current Every Day Smoker


Frequency of alcohol use: None


Drug Abuse: None


Family History: Reviewed & Not Pertinent





- Past Medical History


Cardiac Medical History: Reports: Hx Hypercholesterolemia - no meds, Hx 

Hypertension


Endocrine Medical History: Reports: Hx Diabetes Mellitus Type 1, Hx Diabetes 

Mellitus Type 2


Renal/ Medical History: Denies: Hx Peritoneal Dialysis


Malignancy Medical History: Reports Hx Colorectal Cancer


Psychiatric Medical History: Reports: Hx Anxiety, Hx Depression


Past Surgical History: Reports: Hx Appendectomy, Hx Bowel Surgery - cancer, Hx 

Cholecystectomy





- Immunizations


Hx Diphtheria, Pertussis, Tetanus Vaccination: Yes





Review of Systems





- Review of Systems


Notes: 





REVIEW OF SYSTEMS:





CONSTITUTIONAL :    Denies recent illness.  Denies recent unintentional weight 

loss.  Denies fever,  chills, or sweats. 


EENT: Denies eye, ear, throat, or mouth pain, discharge, or symptoms.  Denies 

nasal or sinus congestion.


CARDIOVASCULAR:  Denies chest pain.


RESPIRATORY: Denies shortness of breath, cough, congestion, difficulty breathing

, or wheezing. 


GASTROINTESTINAL: Denies nausea, vomiting, and diarrhea.  Denies abdominal 

pain.  Denies constipation.  Last BM: 


GENITOURINARY:  Denies difficulty urinating, burning, blood in urine, urgency 

or frequency.


MUSCULOSKELETAL:  Denies neck and back pain.  Denies joint pain or swelling.


SKIN:   Denies rash, itchiness, or lesions


HEMATOLOGIC :   Denies easy bruising or bleeding.


LYMPHATIC:  Denies swollen, painful, enlarged glands.


NEUROLOGICAL: See HPI. denies no numbness or tingling denies weakness.   Denies 

altered mental status.  Denies alteration in speech.


PSYCHIATRIC: See HPI.  Denies stress, alteration in sleep patterns, or 

depression.





All other systems reviewed and negative.





Physical Exam





- Vital signs


Vitals: 


 











Temp Pulse Resp BP Pulse Ox


 


 97.6 F   92   14   165/104 H  95 


 


 11/11/18 04:26  11/11/18 04:26  11/11/18 04:26  11/11/18 04:26  11/11/18 04:26














- Notes


Notes: 





PHYSICAL EXAMINATION:





GENERAL: Appears well, healthy, well-nourished, no acute distress. 





HEAD:  Normocephalic, atraumatic.





EYES:  PERRL, conjunctiva normal, all extraocular movements intact, sclera 

nonicteric





ENT:  Moist mucous membranes. 





NECK: Supple, no noticeable swelling, redness, rash.  Normal range of motion.





LUNGS: Equal breath sounds bilaterally and clear to auscultation.  No wheezes 

rales or rhonchi.





CARDIOVASCULAR: S1-S2, regular rate, regular rhythm.  Radial pulses 2+, normal.





ABDOMEN: Normoactive bowel sounds.  Soft, nontender,  no guarding, no rebound 

tenderness, and no masses palpated.





EXTREMITIES: Normal strength and range of motion, no pitting or edema.  No 

cyanosis. 





NEUROLOGICAL: Moves all extremities upon command.  Strength 5/5 in all 

extremities. 





PSYCH: Normal mood, normal affect.





SKIN: Warm, dry.  No rash, lesions, ulcerations noted.  Normal skin turgor.





Course





- Re-evaluation


Re-evalutation: 





11/11/18 04:58


Due to the patient's presentation and complaints having a headache that is 

related to headaches he has had in the past, I do not suspect he has a subdural 

hematoma, subarachnoid hemorrhage, intracranial tumor, or any other life-

threatening issues at this time.  He will be given Reglan and Toradol for 

headache pain control.


11/11/18 05:53


Patient is pacing the halls and has not had relief from his Vistaril, Ativan 

will be ordered to help him with his anxiety.


11/11/18 06:20


Patient states that his anxiety has greatly improved.  He is stable for 

discharge, with complete resolution of his headache.  Verbal discharge 

instructions were given to the patient at bedside.  Patient verbalized 

understanding.  His friend will be taking him home.





- Vital Signs


Vital signs: 


 











Temp Pulse Resp BP Pulse Ox


 


 97.6 F   92   14   165/104 H  95 


 


 11/11/18 04:26  11/11/18 04:26  11/11/18 04:26  11/11/18 04:26  11/11/18 04:26














Discharge





- Discharge


Clinical Impression: 


 Headache, Anxiety





Condition: Stable


Disposition: HOME, SELF-CARE


Additional Instructions: 


You have been seen in the emergency department for a headache and anxiety.  

Please establish a primary care doctor so you can get started back on your 

Vistaril.  If you have a fever greater than 100.4 F, develop the worst 

headache of your life, or have any concerns that are worrisome to you, please 

return to the emergency department.

## 2018-11-16 NOTE — ER DOCUMENT REPORT
ED General





- General


Chief Complaint: Shortness Of Breath


Stated Complaint: DIFFICULTY BREATHING


Time Seen by Provider: 11/16/18 02:36


Notes: 





56-year-old male presents with shortness of breath onset 3 hours ago non-

positional constant "like my throat is closing up".  He does not have pleuritic 

pain or chest pain or chest pressure.  He denies leg edema.  The patient has a 

history of multiple ED presentations for shortness of breath most recently 2 

weeks ago when he was found to have an elevated troponin and was transferred to 

[facility].  He says that he had a catheterization through his wrist but did 

not receive stents, but was diagnosed with heart failure and is 100% compliance 

with all of his medicines.


TRAVEL OUTSIDE OF THE [place] IN LAST 30 DAYS: No





- Related Data


Allergies/Adverse Reactions: 


 





Penicillins Allergy (Unknown, Verified 11/11/18 04:25)


 











Past Medical History





- Social History


Smoking Status: Current Every Day Smoker


Smoking Education Provided: Yes - The patient ED visit today was directly 

related to their abuse of tobacco. 


Family History: Reviewed & Not Pertinent





- Past Medical History


Cardiac Medical History: Reports: Hx Hypercholesterolemia - no meds, Hx 

Hypertension


Endocrine Medical History: Reports: Hx Diabetes Mellitus Type 1, Hx Diabetes 

Mellitus Type 2


Renal/ Medical History: Denies: Hx Peritoneal Dialysis


Malignancy Medical History: Reports Hx Colorectal Cancer


Psychiatric Medical History: Reports: Hx Anxiety, Hx Depression


Past Surgical History: Reports: Hx Appendectomy, Hx Bowel Surgery - cancer, Hx 

Cholecystectomy





- Immunizations


Hx Diphtheria, Pertussis, Tetanus Vaccination: Yes





Review of Systems





- Review of Systems


Notes: 





REVIEW OF SYSTEMS





GEN: Denies fever, chills, weight loss


ENT: Denies sore throat, nasal discharge, ear pain


EYES: Denies blurry vision, eye pain, discharge


CV: Denies chest pain, positive palpitations, denies edema


RESP: Shortness of breath occasional orthopnea


GI: Denies abdominal pain, nausea, vomiting, diarrhea


MSK: Denies joint pain/swelling, edema, 


SKIN: Denies rash, skin lesions


LYMPH: Denies swollen glands/lymph nodes


NEURO: Denies headache, focal weakness or numbness, dizziness


PSYCH: Denies depression, suicidal or homicidal ideation








PHYSICAL EXAMINATION





General: No acute distress, well-nourished


Head: Atraumatic, normocephalic


ENT: Mouth normal, oropharynx moist, no exudates or tonsillar enlargement


Eyes: Conjunctiva normal, pupils equal, lids normal


Neck: No JVD, supple, no guarding


CVS: Normal rate, regular rhythm, no murmurs


Resp: No resp distress, equal and normal breath sounds bilaterally


GI: Nondistended, soft, no tenderness to palpation, no rebound or guarding


Ext: No deformities, no edema, normal range of motion in upper and lower ext


Back: No CVA or midline TTP


Skin: No rash, warm


Lymphatic: No lymphadeopathy noted


Neuro: Awake, alert.  Face symmetric.  GCS 15.





Physical Exam





- Vital signs


Vitals: 


 











Temp Pulse Resp BP Pulse Ox


 


 97.5 F   100   16   190/120 H  95 


 


 11/16/18 02:19  11/16/18 02:19  11/16/18 02:19  11/16/18 02:19  11/16/18 02:19














Course





- Re-evaluation


Re-evalutation: 





11/16/18 02:45


Patient with recent onset heart failure and long smoking history presents with 

shortness of breath in the setting of acute hypertension.  Suspect diastolic 

heart failure as a cause, also has palpitations but pulse is regular on exam.  

Will check for acute ischemic change on EKG, arrhythmia, x-ray BNP and labs for 

elevated troponin/heart failure.  Will use empiric Nitropaste to gently lower 

his pressure if we do find that he has some pulmonary edema or there is a 

suspicion for hypertensive emergency here.


11/16/18 03:08


Patient's EKG shows nonspecific changes but no definite STEMI.  There is no ST 

depression either.  He complains of tightness in his throat as well, but his 

exam is normal.  He has no oral pharyngeal swelling.  He has multiple prior 

presentations for anxiety.


Patient was noted to be incidentally hypoglycemic, without symptoms.  He ate 

several crackers and peanut butter without any difficulty swallowing or talking.


11/16/18 03:59


She is reassessed at 3:50 AM.  His pressures come down to the 150 range.  He is 

less short of breath now.  He states that "it feels like I have sleep apnea" 

except when he is awake.  He denies current difficulty swallowing or breathing 

and has no chest pain.  His troponin is negative, and his BNP is slightly 

elevated, but less high than it was on prior visits.  He has some hypokalemia 

which I repleted orally.  I do not believe this patient shortness of breath as 

a result of acute diastolic heart failure, pulmonary edema, pneumonia or acute 

coronary syndrome based on his ED workup and I think he is safe for discharge, 

he has an appointment in 4 days with his primary.


I have discussed with the patient there likely diagnosis, aftercare plan, follow

-up plans and my usual and customary return precautions.  They verbalized 

understanding of this.





- Vital Signs


Vital signs: 


 











Temp Pulse Resp BP Pulse Ox


 


 97.5 F   100   31 H  165/101 H  95 


 


 11/16/18 02:19  11/16/18 02:19  11/16/18 03:01  11/16/18 03:01  11/16/18 03:01














- Laboratory


Result Diagrams: 


 11/16/18 02:36





 11/16/18 02:36


Laboratory results interpreted by me: 


 











  11/16/18 11/16/18 11/16/18





  02:36 02:36 02:36


 


RDW  14.2 H  


 


Seg Neutrophils %  30.8 L  


 


Lymphocytes %  55.7 H  


 


Sodium   146.2 H 


 


Potassium   3.2 L 


 


Chloride   109 H 


 


BUN   21 H 


 


Glucose   52 L 


 


NT-Pro-B Natriuret Pep    2870 H














- Diagnostic Test


Radiology reviewed: Image reviewed, Reports reviewed





- EKG Interpretation by Me


EKG shows normal: Sinus rhythm


Rate: Normal


When compared to previous EKG there are: Changes noted - New upright T waves in 

lead III, as well as V4 through V6, they were inverted on previous EKGs with no 

acute ST elevation or depression.





Discharge





- Discharge


Clinical Impression: 


 Shortness of breath





Condition: Good


Disposition: HOME, SELF-CARE


Instructions:  Congestive Heart Failure ([facility])


Additional Instructions: 


Please follow-up with your regular doctor in 4 days as already

## 2018-11-16 NOTE — EKG REPORT
SEVERITY:- ABNORMAL ECG -

SINUS RHYTHM

PROBABLE LEFT ATRIAL ABNORMALITY

PROBABLE LEFT VENTRICULAR HYPERTROPHY

BORDERLINE PROLONGED QT INTERVAL

:

Confirmed by: Rose Macario 16-Nov-2018 10:41:55

## 2018-11-16 NOTE — RADIOLOGY REPORT (SQ)
EXAM DESCRIPTION: 



XR CHEST 1 VIEW



COMPLETED DATE/TME:  11/16/2018 02:37



CLINICAL HISTORY: 



56 years, Male, sob



COMPARISON:

10/30/2018 chest x-ray



NUMBER OF VIEWS:

2



TECHNIQUE:

AP portable upright chest



LIMITATIONS:

None.



FINDINGS:



Heart size at the upper limits of normal. Atheromatous change of

the thoracic aorta. Underlying hyperinflation. No pneumothorax.

Lungs are clear



IMPRESSION:



Borderline cardiomegaly with underlying hyperinflation

 



 2011 [facility] - [employer]- All Rights Reserved

## 2018-12-20 NOTE — EKG REPORT
SEVERITY:- BORDERLINE ECG -

SINUS RHYTHM

PROBABLE LEFT ATRIAL ABNORMALITY

BORDERLINE T WAVE ABNORMALITIES

:

Confirmed by: Dariel Fletcher MD 20-Dec-2018 07:50:36

## 2018-12-20 NOTE — RADIOLOGY REPORT (SQ)
EXAM DESCRIPTION:

XR CHEST 1 VIEW



COMPLETED DATE/TME:  12/20/2018 06:22



CLINICAL HISTORY:

57 years Male, shortness of breath



COMPARISON:11/16/2018



NUMBER OF VIEWS/TECHNIQUE:

1/AP 



FINDINGS:



Adequate lung volume, clear parenchyma, normal cardiac

silhouette, atherosclerosis, and intact bony thorax.



IMPRESSION:



No acute cardiopulmonary findings.

## 2018-12-20 NOTE — ER DOCUMENT REPORT
ED General





- General


Chief Complaint: Shortness Of Breath


Stated Complaint: SHORTNESS OF BREATH


Time Seen by Provider: 12/20/18 06:22


Notes: 





Patient is a diabetes mellitus, CHF that presents to the emergency department 

for chief complaint of shortness of breath.  Patient states that he woke up 

because he felt short of breath while lying down this evening and this morning, 

he was told by his cardiologist, if he experiences symptoms, to come to the 

emergency department to be evaluated.  He is currently on Lasix, and Entresto 

and carvedilol, he was recently diagnosed with CHF.  This makes him very 

nervous, he does have baseline anxiety, he usually takes hydroxyzine, which she 

is out of.  He states he cannot discern whether this is his CHF or his anxiety 

so he wanted to be evaluated.  He denies having any chest pain, recent fevers, 

chills, night sweats, cough, leg swelling, nausea or vomiting.





Past Medical History: Diabetes mellitus, CHF, hypertension, history of colon 

cancer status post resection in remission, anxiety


Past Surgical History: Colon resection, cholecystectomy


Social History: Admits to smoking cigarettes daily, denies alcohol or drug use


Family History: Reviewed and noncontributory for presenting illness


Allergies: Reviewed, see documented allergy list. 





REVIEW OF SYSTEMS:


Other than noted above, the 12 point review of systems was reviewed with the 

patient and were negative, all pertinent findings are included in the HPI.





PHYSICAL EXAMINATION:





Vital signs reviewed, nursing noted reviewed. 





GENERAL: Well-appearing, well-nourished and in no acute distress.





HEAD: Atraumatic, normocephalic.





EYES: Eyes appear normal, extraocular movements intact, sclera anicteric, 

conjunctiva are normal.





ENT: nares patent, oropharynx clear without exudates.  Moist mucous membranes.





NECK: Normal range of motion, supple without lymphadenopathy





LUNGS: Breath sounds clear to auscultation bilaterally and equal.  No wheezes 

rales or rhonchi.





HEART: Regular rate and rhythm without murmurs





ABDOMEN: Soft, nontender, normoactive bowel sounds.  No rebound, guarding, or 

rigidity. No masses appreciated.





EXTREMITIES: Nontender, good range of motion, no pitting or edema.  





NEUROLOGICAL: No focal neurological deficits. Moves all extremities 

spontaneously Motor and sensory grossly intact on exam.





PSYCH: Normal mood, normal affect.





SKIN: Warm, Dry, normal turgor, no rashes or lesions noted on exposed skin


TRAVEL OUTSIDE OF THE [place] IN LAST 30 DAYS: No





- Related Data


Allergies/Adverse Reactions: 


                                        





Penicillins Allergy (Unknown, Verified 11/11/18 04:25)


   











Past Medical History





- Social History


Smoking Status: Current Every Day Smoker


Family History: Reviewed & Not Pertinent





- Past Medical History


Cardiac Medical History: Reports: Hx Hypercholesterolemia - no meds, Hx 

Hypertension


Endocrine Medical History: Reports: Hx Diabetes Mellitus Type 1, Hx Diabetes 

Mellitus Type 2


Renal/ Medical History: Denies: Hx Peritoneal Dialysis


Malignancy Medical History: Reports Hx Colorectal Cancer


Psychiatric Medical History: Reports: Hx Anxiety, Hx Depression


Past Surgical History: Reports: Hx Appendectomy, Hx Bowel Surgery - cancer, Hx 

Cholecystectomy





- Immunizations


Hx Diphtheria, Pertussis, Tetanus Vaccination: Yes





Physical Exam





- Vital signs


Vitals: 


                                        











Temp Pulse Resp BP Pulse Ox


 


 97.8 F   77   18   148/83 H  95 


 


 12/20/18 06:08  12/20/18 06:08  12/20/18 06:08  12/20/18 06:08  12/20/18 06:08














Course





- Re-evaluation


Re-evalutation: 





Patient seen and examined vital signs reviewed. 





Laboratory data and imaging were ordered as appropriate for the patient's 

presenting symptoms and complaint, with consideration of any critical or life 

threatening conditions that may be associated with their obtained history and 

exam as noted above.





Patient was treated with hydroxyzine, and given potassium for his hypokalemia





Results were reviewed when available and demonstrated potassium 3.1, patient is 

current on Lasix, he was on potassium supplements, but then was placed on 

Entresto, and taken off of that, I discussed with him to follow-up with his 

cardiologist regarding this, will place him on an extra 10 mEq once a day for 7 

days and then follow-up with his cardiologist he has an appointment coming up 

just after the new year.





The patient was re-evaluated and was stable, and felt improved





Evaluation was most consistent with dyspnea, likely anxiety related, and 

hypokalemia.  Will refill his hydroxyzine as well.





Results were discussed with the patient at this point, after careful 

consideration I feel that that patient can be discharged from the emergency 

department, the patient was educated treatments and reasons to return to the 

emergency department based on their presumed diagnosis as noted above, they were

advised to followup with a primary care physician in 2-3 days. Patient was 

agreeable to plan of care.





*Note is created using voice recognition software and may contain spelling, 

syntax or grammatical errors.








Laboratory











  12/20/18 12/20/18 12/20/18





  06:34 06:34 06:34


 


WBC  6.0  


 


RBC  4.85  


 


Hgb  14.7  


 


Hct  41.2  


 


MCV  85  


 


MCH  30.2  


 


MCHC  35.6  


 


RDW  13.5  


 


Plt Count  220  


 


Seg Neutrophils %  51.5  


 


Lymphocytes %  38.6  


 


Monocytes %  6.5  


 


Eosinophils %  2.5  


 


Basophils %  0.9  


 


Absolute Neutrophils  3.1  


 


Absolute Lymphocytes  2.3  


 


Absolute Monocytes  0.4  


 


Absolute Eosinophils  0.2  


 


Absolute Basophils  0.1  


 


Sodium   140.6 


 


Potassium   3.1 L 


 


Chloride   105 


 


Carbon Dioxide   28 


 


Anion Gap   8 


 


BUN   15 


 


Creatinine   0.69 


 


Est GFR ( Amer)   > 60 


 


Est GFR (Non-Af Amer)   > 60 


 


Glucose   280 H 


 


Calcium   8.9 


 


Total Bilirubin   0.8 


 


Direct Bilirubin   0.4 


 


Neonat Total Bilirubin   Not Reportable 


 


Neonat Direct Bilirubin   Not Reportable 


 


Neonat Indirect Bili   Not Reportable 


 


AST   44 


 


ALT   45 


 


Alkaline Phosphatase   80 


 


Troponin I    < 0.012


 


NT-Pro-B Natriuret Pep    1920 H


 


Total Protein   6.3 


 


Albumin   3.4 L 











                                        





Chest X-Ray  12/20/18 06:22


IMPRESSION:


 


No acute cardiopulmonary findings.


 




















- Vital Signs


Vital signs: 


                                        











Temp Pulse Resp BP Pulse Ox


 


 97.8 F   77   21 H  167/94 H  99 


 


 12/20/18 06:08  12/20/18 06:08  12/20/18 07:01  12/20/18 07:01  12/20/18 07:01














- Laboratory


Result Diagrams: 


                                 12/20/18 06:34





                                 12/20/18 06:34


Laboratory results interpreted by me: 


                                        











  12/20/18 12/20/18





  06:34 06:34


 


Potassium  3.1 L 


 


Glucose  280 H 


 


NT-Pro-B Natriuret Pep   1920 H


 


Albumin  3.4 L 














- EKG Interpretation by Me


Additional EKG results interpreted by me: 


EKG demonstrates sinus rhythm with a ventricular rate of 74 bpm, normal axis, 

 ms, no evidence of acute ischemia on this EKG, this is compared to prior

EKG from 11/16/2018, without significant change.





Discharge





- Discharge


Clinical Impression: 


 Hypokalemia, Hyperglycemia





Dyspnea


Qualifiers:


 Dyspnea type: unspecified Qualified Code(s): R06.00 - Dyspnea, unspecified





Condition: Stable


Disposition: HOME, SELF-CARE


Instructions:  Dyspnea, Nonspecific ([facility])


Additional Instructions: 


Please follow-up with your cardiologist, regarding her potassium levels, take 

the prescribed potassium once daily for the next week, you may take the 

hydroxyzine, 3-4 times daily as needed for anxiety symptoms, if you have 

worsening or return of symptoms and feel comfortable, do not hesitate to return 

to the emergency department.


Prescriptions: 


Hydroxyzine HCl [Atarax 25 mg Tablet] 1 tab PO Q8H PRN #30 tablet


 PRN Reason: Anxiety


Potassium Chloride [Klor-Con 10 Meq Capsule ER] 10 meq PO DAILY #7 tablet.sa


Referrals: 


NICA MACK MD [Primary Care Provider] - Follow up in 3-5 days

## 2019-03-13 NOTE — RADIOLOGY REPORT (SQ)
EXAM DESCRIPTION:  CERV SP 4 OR 5 VIEWS



COMPLETED DATE/TIME:  3/13/2019 12:07 pm



REASON FOR STUDY:  neck pain



COMPARISON:  None.



NUMBER OF VIEWS:  Five views including obliques.



TECHNIQUE:  AP, lateral, obliques and odontoid radiographic images acquired of the cervical spine.



LIMITATIONS:  None.



FINDINGS:  MINERALIZATION: Normal.

SEGMENTATION: Normal.

ALIGNMENT: 4 to 5 mm anterolisthesis of C 3 on C 4 and 2 to 3 mm anterolisthesis of C2 on C3 and C 4 
on C5.

VERTEBRAE: Maintained height.  No fracture or worrisome bone lesion.

DISCS: Multilevel disc space narrowing with osteophytes.

POSTERIOR ELEMENTS: Pedicles and facets are intact.  No posterior arch defects.

Facet arthropathy is present.

FORAMINA: Narrowed at the levels of maximal disc and facet disease.

HARDWARE: None in the spine.

PARASPINAL SOFT TISSUES: Normal.

OTHER: No other significant finding.



IMPRESSION:  SPONDYLOSIS WITHOUT BONE LESION OR FRACTURE.



TECHNICAL DOCUMENTATION:  JOB ID:  [id number]

 2011 [employer]- All Rights Reserved



Reading location - IP/workstation name: [email or web address]

## 2019-03-13 NOTE — ER DOCUMENT REPORT
ED Medical Screen (RME)





- General


Chief Complaint: Pain All Over


Stated Complaint: BACK/NECK/SHOULDER/LEG PAIN


Primary Care Provider: 


NICA MACK MD [Primary Care Provider] - Follow up as needed


TRAVEL OUTSIDE OF THE [place] IN LAST 30 DAYS: No





- HPI


Notes: 





03/13/19 11:39


Patient is complaining of pain all over his body.  Today he woke up this morning

with neck pain.  Denies any history of trauma or injury.  The pain is localized 

mostly on the right side of his body.





- Related Data


Allergies/Adverse Reactions: 


                                        





Penicillins Allergy (Unknown, Verified 03/13/19 10:59)


   











Past Medical History





- Social History


Frequency of alcohol use: None


Drug Abuse: None





- Past Medical History


Cardiac Medical History: Reports: Hx Hypercholesterolemia - no meds, Hx 

Hypertension


Endocrine Medical History: Reports: Hx Diabetes Mellitus Type 1, Hx Diabetes 

Mellitus Type 2


Renal/ Medical History: Denies: Hx Peritoneal Dialysis


Malignancy Medical History: Reports Hx Colorectal Cancer


Psychiatric Medical History: Reports: Hx Anxiety, Hx Depression


Past Surgical History: Reports: Hx Appendectomy, Hx Bowel Surgery - cancer, Hx 

Cholecystectomy





- Immunizations


Hx Diphtheria, Pertussis, Tetanus Vaccination: Yes


History of Influenza Vaccine for 10/2017 - 3/2018 Season: No





Physical Exam





- Vital signs


Vitals: 





                                        











Temp Pulse Resp BP Pulse Ox


 


 99.2 F   76   18   177/96 H  98 


 


 03/13/19 11:16  03/13/19 11:16  03/13/19 11:16  03/13/19 11:16  03/13/19 11:16














Course





- Vital Signs


Vital signs: 





                                        











Temp Pulse Resp BP Pulse Ox


 


 99.2 F   76   18   177/96 H  98 


 


 03/13/19 11:16  03/13/19 11:16  03/13/19 11:16  03/13/19 11:16  03/13/19 11:16














Doctor's Discharge





- Discharge


Referrals: 


NICA MACK MD [Primary Care Provider] - Follow up as needed

## 2019-03-13 NOTE — RADIOLOGY REPORT (SQ)
EXAM DESCRIPTION:  CT ABD/PELVIS NO ORAL OR IV



COMPLETED DATE/TIME:  3/13/2019 2:08 pm



REASON FOR STUDY:  flank pain, uti



COMPARISON:  None.



TECHNIQUE:  CT scan of the abdomen and pelvis performed without intravenous or oral contrast. Images 
reviewed with lung, soft tissue, and bone windows. Reconstructed coronal and sagittal MPR images revi
ewed. All images stored on PACS.

All CT scanners at this facility use dose modulation, iterative reconstruction, and/or weight based d
osing when appropriate to reduce radiation dose to as low as reasonably achievable (ALARA).

[facility]: Dose Right  [facility]: CareDose    [facility]: Dose Right    [facility]: Teradose 4D    [facility]: Smart Dextr



RADIATION DOSE:  CT Rad equipment meets quality standard of care and radiation dose reduction techniq
ues were employed. CTDIvol: 14.4 mGy. DLP: 756 mGy-cm.mGy.



LIMITATIONS:  None.



FINDINGS:  LOWER CHEST: No significant findings. No nodules or infiltrates.

NON-CONTRASTED LIVER, SPLEEN, ADRENALS: Evaluation limited by lack of IV contrast. No identified sign
ificant masses.

PANCREAS: No masses. No peripancreatic inflammatory changes.

GALLBLADDER: Surgically absent.

RIGHT KIDNEY AND URETER: Nonobstructive lower pole punctate nephrolithiasis.  No ureteral calculi.

LEFT KIDNEY AND URETER: No solid masses. No significant calcification. No hydronephrosis or hydrouret
er.

AORTA AND RETROPERITONEUM: No aneurysm. No retroperitoneal masses or adenopathy.

BOWEL AND PERITONEAL CAVITY: Mild areas of apparent wall thickening in the ascending colon and mid tr
ansverse colon.  Potentially simply underdistention.  No dilated loops.  No ascites or abnormal gas.

APPENDIX: Not visualized.

PELVIS, BLADDER, AND ABDOMINAL WALL:Prostate enlarged.  Bladder dome wall thickening, nonspecific.  N
o discrete mass.  Further evaluation is probably warranted, however.

BONES: No significant findings.

OTHER: No other significant finding.



IMPRESSION:

1. Probable artifactual thickening in the ascending and transverse colon.  Colitis is felt to be less
 likely.

2. Nonobstructive right nephrolithiasis.

3. Mild wall thickening in the bladder dome.  Consider elective urology followup.



TECHNICAL DOCUMENTATION:  JOB ID:  [id number]

Quality ID # [id number]: Final reports with documentation of one or more dose reduction techniques (e.g., Au
tomated exposure control, adjustment of the mA and/or kV according to patient size, use of iterative 
reconstruction technique)

 2011 [employer]- All Rights Reserved



Reading location - IP/workstation name: [email or web address]
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
## 2019-03-13 NOTE — RADIOLOGY REPORT (SQ)
EXAM DESCRIPTION:  CT CERVICAL SPINE WITHOUT



COMPLETED DATE/TIME:  3/13/2019 2:08 pm



REASON FOR STUDY:  neck pain



COMPARISON:  None.



TECHNIQUE:  Axial images acquired through the cervical spine without intravenous contrast.  Images re
viewed with lung, soft tissue and bone windows.  Reconstructed coronal and sagittal MPR images review
ed.  Images stored on PACS.

All CT scanners at this facility use dose modulation, iterative reconstruction, and/or weight based d
osing when appropriate to reduce radiation dose to as low as reasonably achievable (ALARA).

[facility]: Dose Right  [facility]: CareDose    [facility]: Dose Right    [facility]: Teradose 4D    [facility]: Smart Technologies



RADIATION DOSE:  CT Rad equipment meets quality standard of care and radiation dose reduction techniq
ues were employed. CTDIvol: 15.9 mGy. DLP: 340 mGy-cm. mGy.



LIMITATIONS:  None.



FINDINGS:  ALIGNMENT: Mild anterolisthesis at C2-3, C3-4.  This looks degenerative.

MINERALIZATION: Normal.

VERTEBRAL BODIES: No fractures or dislocation.

DISCS: Disc disease with narrowing, most pronounced at C5-6 and C6-7 with small associated osteophyte
s.

FACETS, LATERAL MASSES, POSTERIOR ELEMENTS: Multilevel facet arthropathy with irregularity, associate
d cysts and erosions and spurring.

HARDWARE: None in the spine.

VISUALIZED RIBS: No fractures.

LUNG APICES AND SOFT TISSUES: Apical emphysema.

OTHER: No other significant finding.



IMPRESSION:  CHRONIC DEGENERATIVE CHANGES.  NO ACUTE FINDINGS.



TECHNICAL DOCUMENTATION:  JOB ID:  [id number]

Quality ID # [id number]: Final reports with documentation of one or more dose reduction techniques (e.g., Au
tomated exposure control, adjustment of the mA and/or kV according to patient size, use of iterative 
reconstruction technique)

 2011 [employer]- All Rights Reserved



Reading location - IP/workstation name: [email or web address]

## 2020-02-26 NOTE — ER DOCUMENT REPORT
ED Medical Screen (RME)





- General


Chief Complaint: Headache


Stated Complaint: MIGRAINE


Time Seen by Provider: 02/26/20 14:59


Primary Care Provider: 


NICA MACK MD [Primary Care Provider] - Follow up as needed


Notes: 





Patient is a 58-year-old male with a history of migraines who presents emergency

department with a chief complaint of headache.  Patient reports he was up this 

morning with anxiety when he developed a headache around 5 AM.  Patient reports 

this does feel like his typical migraine headache.  Patient does not take 

anything for his migraines and did not take anything today for his headache.  

Patient reports the headache starts in the right posterior head and radiates 

upward into the back of bilateral eyes.  Patient denies visual changes.  Patient

denies fever.  Patient denies neck stiffness.  Patient reports nausea without 

vomiting or diarrhea.


TRAVEL OUTSIDE OF THE [place] IN LAST 30 DAYS: No





- Related Data


Allergies/Adverse Reactions: 


                                        





Penicillins Allergy (Unknown, Verified 03/13/19 10:59)


   











Past Medical History





- Social History


Frequency of alcohol use: Rare


Drug Abuse: None





- Past Medical History


Cardiac Medical History: Reports: Hx Hypercholesterolemia - no meds, Hx 

Hypertension


Endocrine Medical History: Reports: Hx Diabetes Mellitus Type 1, Hx Diabetes 

Mellitus Type 2


Renal/ Medical History: Denies: Hx Peritoneal Dialysis


Malignancy Medical History: Reports Hx Colorectal Cancer


Musculoskeltal Medical History: Reports Hx Arthritis


Psychiatric Medical History: Reports: Hx Anxiety, Hx Depression


Past Surgical History: Reports: Hx Appendectomy, Hx Bowel Surgery - cancer, Hx 

Cholecystectomy





- Immunizations


Hx Diphtheria, Pertussis, Tetanus Vaccination: Yes





Physical Exam





- Vital signs


Vitals: 





                                        











Temp Pulse Resp BP Pulse Ox


 


 97.7 F   66   20   120/77   98 


 


 02/26/20 12:28  02/26/20 12:28  02/26/20 12:28  02/26/20 12:28  02/26/20 12:28














- HEENT


Head: Normocephalic


Pupils: PERRL





Course





- Re-evaluation


Re-evalutation: 





02/26/20 15:07


We will order migraine cocktail to be given here in triage.  Patient no acute 

distress.





I have greeted and performed a rapid initial assessment of this patient.  A 

comprehensive ED assessment and evaluation of the patient, analysis of test re

sults and completion of the medical decision making process will be conducted by

additional ED providers.





- Vital Signs


Vital signs: 





                                        











Temp Pulse Resp BP Pulse Ox


 


 97.7 F   66   20   120/77   98 


 


 02/26/20 12:28  02/26/20 12:28  02/26/20 12:28  02/26/20 12:28  02/26/20 12:28














Doctor's Discharge





- Discharge


Referrals: 


NICA MACK MD [Primary Care Provider] - Follow up as needed